# Patient Record
Sex: FEMALE | Race: WHITE | NOT HISPANIC OR LATINO | Employment: FULL TIME | URBAN - METROPOLITAN AREA
[De-identification: names, ages, dates, MRNs, and addresses within clinical notes are randomized per-mention and may not be internally consistent; named-entity substitution may affect disease eponyms.]

---

## 2023-02-01 NOTE — TELEPHONE ENCOUNTER
----- Message from Jorge Justice sent at 2/1/2023 12:41 PM CST -----       Type: Patient Returning Call    Who Called: Patient   Who Left Message for Patient: NA  Does the patient know what this is regarding?: Patient would like a call back to see if she could speak to a member of the staff regarding a medication that Dr. Thompson prescribed at her previous location. Would also like to establish care with the provider at [facility], but [facility] shows no available appointments for the provider.   Would the patient rather a call back or a response via [facility]? call  Best Call Back Number: [phone]  Additional Information: Please assist, thank you!

## 2023-02-02 RX ORDER — SEMAGLUTIDE 1.34 MG/ML
1 INJECTION, SOLUTION SUBCUTANEOUS
Qty: 30 PEN | Refills: 2 | Status: SHIPPED | OUTPATIENT
Start: 2023-02-02 | End: 2023-03-10

## 2023-02-07 ENCOUNTER — TELEPHONE (OUTPATIENT)
Dept: PRIMARY CARE CLINIC | Facility: CLINIC | Age: 43
End: 2023-02-07
Payer: COMMERCIAL

## 2023-02-07 NOTE — TELEPHONE ENCOUNTER
----- Message from Arianna Garcia sent at 2/7/2023  8:32 AM CST -----  Please call pt @ [phone] regarding medication Ozempic, pt states [facility] needing a prior authorization, it was sent last Thursday.

## 2023-02-09 ENCOUNTER — PATIENT MESSAGE (OUTPATIENT)
Dept: PRIMARY CARE CLINIC | Facility: CLINIC | Age: 43
End: 2023-02-09
Payer: COMMERCIAL

## 2023-02-09 ENCOUNTER — TELEPHONE (OUTPATIENT)
Dept: PRIMARY CARE CLINIC | Facility: CLINIC | Age: 43
End: 2023-02-09
Payer: COMMERCIAL

## 2023-02-09 NOTE — TELEPHONE ENCOUNTER
----- Message from Vero Benedict sent at 2/9/2023  8:15 AM CST -----  Contact: Kaitlynn  Patient is calling regarding following up on prior authorization that was faxed. Reports being told to call back today to get an update and discuss what else she needs to do. Please give patient a call back at [phone]

## 2023-02-21 ENCOUNTER — PATIENT MESSAGE (OUTPATIENT)
Dept: PRIMARY CARE CLINIC | Facility: CLINIC | Age: 43
End: 2023-02-21
Payer: COMMERCIAL

## 2023-03-10 ENCOUNTER — PATIENT MESSAGE (OUTPATIENT)
Dept: PRIMARY CARE CLINIC | Facility: CLINIC | Age: 43
End: 2023-03-10
Payer: COMMERCIAL